# Patient Record
Sex: MALE | Race: WHITE | HISPANIC OR LATINO
[De-identification: names, ages, dates, MRNs, and addresses within clinical notes are randomized per-mention and may not be internally consistent; named-entity substitution may affect disease eponyms.]

---

## 2017-03-08 ENCOUNTER — LABORATORY RESULT (OUTPATIENT)
Age: 67
End: 2017-03-08

## 2017-03-22 ENCOUNTER — APPOINTMENT (OUTPATIENT)
Dept: RADIATION ONCOLOGY | Facility: CLINIC | Age: 67
End: 2017-03-22

## 2017-03-22 VITALS
OXYGEN SATURATION: 96 % | HEART RATE: 69 BPM | WEIGHT: 173.94 LBS | DIASTOLIC BLOOD PRESSURE: 79 MMHG | SYSTOLIC BLOOD PRESSURE: 148 MMHG | RESPIRATION RATE: 16 BRPM | BODY MASS INDEX: 31.81 KG/M2

## 2017-04-14 ENCOUNTER — EMERGENCY (EMERGENCY)
Facility: HOSPITAL | Age: 67
LOS: 1 days | Discharge: HOME | End: 2017-04-14
Attending: EMERGENCY MEDICINE | Admitting: EMERGENCY MEDICINE
Payer: MEDICARE

## 2017-04-14 VITALS
SYSTOLIC BLOOD PRESSURE: 149 MMHG | OXYGEN SATURATION: 98 % | TEMPERATURE: 99 F | RESPIRATION RATE: 18 BRPM | HEART RATE: 97 BPM | DIASTOLIC BLOOD PRESSURE: 97 MMHG

## 2017-04-14 VITALS
RESPIRATION RATE: 18 BRPM | SYSTOLIC BLOOD PRESSURE: 134 MMHG | TEMPERATURE: 99 F | OXYGEN SATURATION: 95 % | DIASTOLIC BLOOD PRESSURE: 96 MMHG | HEART RATE: 100 BPM

## 2017-04-14 DIAGNOSIS — J02.9 ACUTE PHARYNGITIS, UNSPECIFIED: ICD-10-CM

## 2017-04-14 DIAGNOSIS — R05 COUGH: ICD-10-CM

## 2017-04-14 PROCEDURE — 99283 EMERGENCY DEPT VISIT LOW MDM: CPT

## 2017-04-14 RX ORDER — IBUPROFEN 200 MG
600 TABLET ORAL ONCE
Qty: 0 | Refills: 0 | Status: COMPLETED | OUTPATIENT
Start: 2017-04-14 | End: 2017-04-14

## 2017-04-14 RX ADMIN — Medication 600 MILLIGRAM(S): at 15:00

## 2017-04-14 NOTE — ED ADULT NURSE NOTE - OBJECTIVE STATEMENT
66ym pt c/o sore throat starting yesterday; pt states getting difficult to swallow; pt is swallowing saliva; no drooling; aox3; no resp distress; lungs clear to auscultation; no chest pain; no n/v/d; no fever; + chills; skin warm dry intact; md at bedside

## 2017-04-14 NOTE — ED PROVIDER NOTE - NS ED MD SCRIBE ATTENDING SCRIBE SECTIONS
HIV/INTAKE ASSESSMENT/SCREENINGS/HISTORY OF PRESENT ILLNESS/VITAL SIGNS( Pullset)/REVIEW OF SYSTEMS/RESULTS/PHYSICAL EXAM/PAST MEDICAL/SURGICAL/SOCIAL HISTORY

## 2017-04-14 NOTE — ED PROVIDER NOTE - ENMT, MLM
Airway patent, Nasal mucosa clear. Mouth with normal mucosa. +mild pharyngeal erythema, no edema, no tonsilar exudates

## 2017-04-14 NOTE — ED PROVIDER NOTE - OBJECTIVE STATEMENT
66 year old male with PMHx of prostate CA, presents with complaint of throat pain worsening since last night. States he feels pain when eating and drinking but denies difficulty swallowing. Denies hoarseness. Reports subjective fever, nasal congestion and mild dry cough. No sick contacts. Denies recent N/V/D, abdominal pain.

## 2017-04-14 NOTE — ED PROVIDER NOTE - MEDICAL DECISION MAKING DETAILS
pt presents with symptoms of pharyngitis. No evidence of strep pharyngitis. Vitals stable, pt well appearing,  tolerating PO. No evidence of deep space infection. Will recommend NSAIDs and PMD followup.

## 2017-07-26 ENCOUNTER — APPOINTMENT (OUTPATIENT)
Dept: RADIATION ONCOLOGY | Facility: CLINIC | Age: 67
End: 2017-07-26

## 2017-07-26 VITALS
BODY MASS INDEX: 30.43 KG/M2 | DIASTOLIC BLOOD PRESSURE: 73 MMHG | WEIGHT: 165.34 LBS | RESPIRATION RATE: 16 BRPM | HEART RATE: 71 BPM | SYSTOLIC BLOOD PRESSURE: 104 MMHG | TEMPERATURE: 96.7 F | HEIGHT: 62 IN | OXYGEN SATURATION: 96 %

## 2018-01-26 ENCOUNTER — APPOINTMENT (OUTPATIENT)
Dept: RADIATION ONCOLOGY | Facility: CLINIC | Age: 68
End: 2018-01-26

## 2018-01-30 LAB
PSA FREE FLD-MCNC: 4.8
PSA FREE SERPL-MCNC: 0.01 NG/ML
PSA SERPL-MCNC: 0.21 NG/ML

## 2018-02-14 ENCOUNTER — APPOINTMENT (OUTPATIENT)
Age: 68
End: 2018-02-14
Payer: MEDICARE

## 2018-02-14 VITALS
DIASTOLIC BLOOD PRESSURE: 83 MMHG | SYSTOLIC BLOOD PRESSURE: 124 MMHG | HEART RATE: 77 BPM | WEIGHT: 169.19 LBS | BODY MASS INDEX: 30.94 KG/M2 | OXYGEN SATURATION: 96 %

## 2018-02-14 PROCEDURE — 99214 OFFICE O/P EST MOD 30 MIN: CPT

## 2018-02-14 RX ORDER — TAMSULOSIN HYDROCHLORIDE 0.4 MG/1
0.4 CAPSULE ORAL
Refills: 0 | Status: ACTIVE | COMMUNITY
Start: 2018-02-14

## 2018-08-17 ENCOUNTER — APPOINTMENT (OUTPATIENT)
Age: 68
End: 2018-08-17
Payer: MEDICARE

## 2018-08-17 PROBLEM — C61 MALIGNANT NEOPLASM OF PROSTATE: Chronic | Status: ACTIVE | Noted: 2017-04-14

## 2018-08-17 PROCEDURE — 99214 OFFICE O/P EST MOD 30 MIN: CPT

## 2018-08-17 RX ORDER — GUAIFENESIN SYRUP AND DEXTROMETHORPHAN 100; 10 MG/5ML; MG/5ML
100-10 SYRUP ORAL
Refills: 0 | Status: DISCONTINUED | COMMUNITY
Start: 2018-02-14 | End: 2018-08-17

## 2018-08-17 RX ORDER — METHYLPREDNISOLONE 4 MG/1
4 TABLET ORAL
Refills: 0 | Status: DISCONTINUED | COMMUNITY
Start: 2018-02-14 | End: 2018-08-17

## 2018-12-19 ENCOUNTER — APPOINTMENT (OUTPATIENT)
Dept: RADIATION ONCOLOGY | Facility: CLINIC | Age: 68
End: 2018-12-19
Payer: MEDICARE

## 2018-12-19 PROCEDURE — 99214 OFFICE O/P EST MOD 30 MIN: CPT

## 2018-12-19 RX ORDER — SILDENAFIL CITRATE 100 MG/1
TABLET, FILM COATED ORAL
Refills: 0 | Status: ACTIVE | COMMUNITY

## 2018-12-19 NOTE — REVIEW OF SYSTEMS
[Negative] : Allergic/Immunologic [Constipation: Grade 0] : Constipation: Grade 0 [Diarrhea: Grade 0] : Diarrhea: Grade 0 [Proctitis: Grade 0] : Proctitis: Grade 0 [Rectal Pain: Grade 0] : Rectal Pain: Grade 0 [Fatigue: Grade 0] : Fatigue: Grade 0 [Hematuria: Grade 0] : Hematuria: Grade 0 [Urinary Incontinence: Grade 0] : Urinary Incontinence: Grade 0  [Urinary Tract Pain: Grade 0] : Urinary Tract Pain: Grade 0 [Erectile Dysfunction: Grade 1 - Decrease in erectile function (frequency or rigidity of erections) but intervention not indicated (e.g., medication or use of mechanical device, penile pump)] : Erectile Dysfunction: Grade 1 - Decrease in erectile function (frequency or rigidity of erections) but intervention not indicated (e.g., medication or use of mechanical device, penile pump) [IPSS Score (0-40): ___] : IPSS score: [unfilled] [EPIC-CP Score (0-60): ___] : EPIC-CP score: [unfilled] [Urinary Frequency: Grade 1 - Present] : Urinary Frequency: Grade 1 - Present

## 2018-12-20 NOTE — PHYSICAL EXAM
[Extraocular Movements] : extraocular movements were intact [Hearing Threshold Finger Rub Not [place]] : hearing was normal [Normal] : oriented to person, place and time, the affect was normal, the mood was normal and not anxious [Blood on examination glove] : no blood on examination glove

## 2018-12-20 NOTE — REASON FOR VISIT
[Routine Follow-Up] : routine follow-up visit for [Prostate Cancer] : prostate cancer [Clinical -- TNM Staging] : TNM Stage: c [T: ___] : T[unfilled] [N: ___] : N[unfilled] [M: ___] : M[unfilled] [FreeTextEntry1] : pretreatment PSA 3.2 ng/mL

## 2018-12-20 NOTE — HISTORY OF PRESENT ILLNESS
[FreeTextEntry1] : Mr. Hipolito Rivas is a 67 y/o male with h/o of [id number] adenocarcinoma of the prostate with a pretreatment PSA of 3.2 ng/mL and [place] 3+4=7 disease, considered intermediate risk classification, AJCC stage IIA.  He received SBRT to a dose of 4250 cGy/ 5fx. He completed treatment on 1/11/16.  \par \par 12/19/18- FOLLOW Up\par Mr. Rivas returns for routine follow up. When he was last seen on 8/17/18, he was doing well and LUZ MARIA. He had gotten PSA checked by Dr. Castellon in the summertime (report not available at this time).  PSA last checked 10/31/18 and it was 0.1 (previous PSA was 0.21 on 1/30/18). He continues to take Flomax once daily.\par \par Today, he reports he continues to feel well, energy is good. Denies GI complaints to include blood in stool, diarrhea, constipation. Has nocturia twice a night, as well as occasional frequency and sensation of incomplete bladder emptying. Denies hematuria. He continues to have difficulty with erections. He tried a medication for ED once (cannot recall name), but it "made no difference" and thus he has not used it again.  IPSS 6, EPIC 13\par \par \par 8/17/18- Follow up\par Mr. Hipolito Rivas is a 67 y/o male with a h/o of [id number] adenocarcinoma of the prostate with a pretreatment PSA of 3.2 ng/mL and [place] 3+4=7 disease, considered intermediate risk classification, AJCC stage IIA.  He received SBRT to a dose of 4250 cGy/ 5fx. He completed treatment on 1/11/16.  At last visit on 2/14/18, he felt well, was advised to follow up in 6 months with new PSA. At the time. meds for ED were discussed, but not covered by insurance. \par \par PSA Trend\par 6/23/16: 1.48 ng/ml\par 11/14/16: 1.43 ng/mL\par 3/8/17 0.69 ng/mL\par 1/30/18: 0.21 ng/ml\par \par Today, he reports he is feeling well, energy is good. Denies having to push to urinate, denies nocturia. Occasionally has sensation of incomplete bladder emptying. He continues to have difficulty with erections. He reports he had PSA checked 2- 3 months ago with Dr. Castellon; results not available at this time. Continues to take Flomax once daily.\par IPSS 6, EPIC 9\par \par \par 2/14/18- Follow up\par Mr. Hipolito Quiroga is a 65 y/o male with a h/o of [id number] adenocarcinoma of the prostate with a pretreatment PSA of 3.2 ng/mL and [place] 3+4=7 disease, considered intermediate risk classification, AJCC stage IIA.  He received SBRT to a dose of 42.5Gy/5fx. He completed treatment on 1/11/16.  At last visit July 2017 he felt well, was recommended follow up in 6 months\par \par Today Mr. Rivas feels well. He has very occasional incomplete emptying, and very infrequently has to push. He has two times nightly nocturia. IPSS 8, EPIC 15. He continues to have difficulty with erections. He saw Dr. Castellon a few months ago, and has follow up scheduled with him next month.\par \par \par

## 2019-06-26 ENCOUNTER — APPOINTMENT (OUTPATIENT)
Dept: RADIATION ONCOLOGY | Facility: CLINIC | Age: 69
End: 2019-06-26

## 2019-07-05 ENCOUNTER — APPOINTMENT (OUTPATIENT)
Dept: RADIATION ONCOLOGY | Facility: CLINIC | Age: 69
End: 2019-07-05
Payer: MEDICARE

## 2019-07-19 NOTE — REVIEW OF SYSTEMS
[IPSS Score (0-40): ___] : IPSS score: [unfilled] [EPIC-CP Score (0-60): ___] : EPIC-CP score: [unfilled] [Negative] : Allergic/Immunologic [Constipation: Grade 0] : Constipation: Grade 0 [Diarrhea: Grade 0] : Diarrhea: Grade 0 [Proctitis: Grade 0] : Proctitis: Grade 0 [Rectal Pain: Grade 0] : Rectal Pain: Grade 0 [Fatigue: Grade 0] : Fatigue: Grade 0 [Hematuria: Grade 0] : Hematuria: Grade 0 [Urinary Incontinence: Grade 0] : Urinary Incontinence: Grade 0  [Urinary Tract Pain: Grade 0] : Urinary Tract Pain: Grade 0 [Urinary Frequency: Grade 1 - Present] : Urinary Frequency: Grade 1 - Present [Erectile Dysfunction: Grade 1 - Decrease in erectile function (frequency or rigidity of erections) but intervention not indicated (e.g., medication or use of mechanical device, penile pump)] : Erectile Dysfunction: Grade 1 - Decrease in erectile function (frequency or rigidity of erections) but intervention not indicated (e.g., medication or use of mechanical device, penile pump)

## 2019-07-24 ENCOUNTER — LABORATORY RESULT (OUTPATIENT)
Age: 69
End: 2019-07-24

## 2019-07-24 ENCOUNTER — APPOINTMENT (OUTPATIENT)
Dept: RADIATION ONCOLOGY | Facility: CLINIC | Age: 69
End: 2019-07-24
Payer: MEDICARE

## 2019-07-24 PROCEDURE — 99214 OFFICE O/P EST MOD 30 MIN: CPT

## 2019-07-24 NOTE — PHYSICAL EXAM
[Extraocular Movements] : extraocular movements were intact [Hearing Threshold Finger Rub Not [place]] : hearing was normal [Blood on examination glove] : no blood on examination glove [Normal] : the ears, nose and oropharynx were normal in appearance [No Focal Deficits] : no focal deficits

## 2019-07-24 NOTE — HISTORY OF PRESENT ILLNESS
[FreeTextEntry1] : Mr. Hipolito Rivas is a 68 y/o male with h/o of [id number] adenocarcinoma of the prostate with a pretreatment PSA of 3.2 ng/mL and [place] 3+4=7 disease, considered intermediate risk classification, AJCC stage IIA. He received SBRT to a dose of 4250 cGy/ 5fx. He completed treatment on 1/11/16. \par \par 7/24/19- FOLLOW UP\par Mr. Rivas returns for routine follow up. When he was last seen on 12/19/18, he was doing well, LUZ MARIA, and at pre-treatment baseline with exception of grade 1 ED. Plan was to RTC 6 months with PSA. His PSA was last checked 3/19 and it was 0.1 ng/ml. \par Today, he feels will, no urinary or bowel complaints, at pre-treatment baseline when taking flomax.  No fatigue, no intervalmedical events.  IPSS 4, EPIC 9.  Continues on Flomax 0.4 mg, symptoms worsen when he skips a dose\par \par \par 12/19/18- FOLLOW Up\par Mr. Rivas returns for routine follow up. When he was last seen on 8/17/18, he was doing well and LUZ MARIA. He had gotten PSA checked by Dr. Castellon in the summertime (report not available at this time).  PSA last checked 10/31/18 and it was 0.1 (previous PSA was 0.21 on 1/30/18). He continues to take Flomax once daily.\par \par Today, he reports he continues to feel well, energy is good. Denies GI complaints to include blood in stool, diarrhea, constipation. Has nocturia twice a night, as well as occasional frequency and sensation of incomplete bladder emptying. Denies hematuria. He continues to have difficulty with erections. He tried a medication for ED once (cannot recall name), but it "made no difference" and thus he has not used it again.  IPSS 6, EPIC 13\par \par \par 8/17/18- Follow up\par Mr. Hipolito Rivas is a 69 y/o male with a h/o of [id number] adenocarcinoma of the prostate with a pretreatment PSA of 3.2 ng/mL and [place] 3+4=7 disease, considered intermediate risk classification, AJCC stage IIA.  He received SBRT to a dose of 4250 cGy/ 5fx. He completed treatment on 1/11/16.  At last visit on 2/14/18, he felt well, was advised to follow up in 6 months with new PSA. At the time. meds for ED were discussed, but not covered by insurance. \par \par PSA Trend\par 6/23/16: 1.48 ng/ml\par 11/14/16: 1.43 ng/mL\par 3/8/17 0.69 ng/mL\par 1/30/18: 0.21 ng/ml\par \par Today, he reports he is feeling well, energy is good. Denies having to push to urinate, denies nocturia. Occasionally has sensation of incomplete bladder emptying. He continues to have difficulty with erections. He reports he had PSA checked 2- 3 months ago with Dr. Castellon; results not available at this time. Continues to take Flomax once daily.\par IPSS 6, EPIC 9\par \par \par 2/14/18- Follow up\par Mr. Hipolito Quiroga is a 65 y/o male with a h/o of [id number] adenocarcinoma of the prostate with a pretreatment PSA of 3.2 ng/mL and [place] 3+4=7 disease, considered intermediate risk classification, AJCC stage IIA.  He received SBRT to a dose of 42.5Gy/5fx. He completed treatment on 1/11/16.  At last visit July 2017 he felt well, was recommended follow up in 6 months\par \par Today Mr. Rivas feels well. He has very occasional incomplete emptying, and very infrequently has to push. He has two times nightly nocturia. IPSS 8, EPIC 15. He continues to have difficulty with erections. He saw Dr. Castellon a few months ago, and has follow up scheduled with him next month.\par \par \par

## 2019-08-01 NOTE — ED ADULT TRIAGE NOTE - CCCP TRG CHIEF CMPLNT
Pt. Brought to IR dept on stretcher and on room air. Pt. Is alert and oriented x3. Pt. Is also on heart, blood pressure, and oxygen sat monitoring. Pt. Has his daughter and mother with him at bedside.  Pt. Educated on port placement along with medications used for procedure and with conscious sedation.    cough

## 2019-11-22 LAB — PSA SERPL-MCNC: 0.04 NG/ML

## 2019-11-26 ENCOUNTER — APPOINTMENT (OUTPATIENT)
Dept: RADIATION ONCOLOGY | Facility: CLINIC | Age: 69
End: 2019-11-26
Payer: MEDICARE

## 2019-11-26 PROCEDURE — 99214 OFFICE O/P EST MOD 30 MIN: CPT

## 2019-11-26 RX ORDER — METOPROLOL SUCCINATE 50 MG/1
50 TABLET, EXTENDED RELEASE ORAL
Qty: 90 | Refills: 0 | Status: ACTIVE | COMMUNITY
Start: 2019-07-31

## 2019-11-26 RX ORDER — CHLORTHALIDONE 25 MG/1
25 TABLET ORAL
Qty: 90 | Refills: 0 | Status: ACTIVE | COMMUNITY
Start: 2019-05-31

## 2019-11-26 RX ORDER — FLUTICASONE PROPIONATE 50 UG/1
50 SPRAY, METERED NASAL
Qty: 16 | Refills: 0 | Status: ACTIVE | COMMUNITY
Start: 2019-09-12

## 2019-11-26 RX ORDER — TERBINAFINE HYDROCHLORIDE 250 MG/1
250 TABLET ORAL
Qty: 30 | Refills: 0 | Status: ACTIVE | COMMUNITY
Start: 2019-07-17

## 2019-11-26 RX ORDER — ATORVASTATIN CALCIUM 40 MG/1
40 TABLET, FILM COATED ORAL
Qty: 90 | Refills: 0 | Status: ACTIVE | COMMUNITY
Start: 2019-06-26

## 2019-11-26 RX ORDER — AMOXICILLIN 875 MG/1
875 TABLET, FILM COATED ORAL
Qty: 20 | Refills: 0 | Status: ACTIVE | COMMUNITY
Start: 2019-11-13

## 2019-11-26 RX ORDER — CYCLOSPORINE 0.5 MG/ML
0.05 EMULSION OPHTHALMIC
Qty: 60 | Refills: 0 | Status: ACTIVE | COMMUNITY
Start: 2019-08-06

## 2019-11-26 NOTE — REVIEW OF SYSTEMS
[IPSS Score (0-40): ___] : IPSS score: [unfilled] [Negative] : Allergic/Immunologic [Constipation: Grade 0] : Constipation: Grade 0 [Diarrhea: Grade 0] : Diarrhea: Grade 0 [Proctitis: Grade 0] : Proctitis: Grade 0 [Rectal Pain: Grade 0] : Rectal Pain: Grade 0 [Fatigue: Grade 0] : Fatigue: Grade 0 [Hematuria: Grade 0] : Hematuria: Grade 0 [Urinary Incontinence: Grade 0] : Urinary Incontinence: Grade 0  [Urinary Tract Pain: Grade 0] : Urinary Tract Pain: Grade 0 [Urinary Frequency: Grade 1 - Present] : Urinary Frequency: Grade 1 - Present [Erectile Dysfunction: Grade 1 - Decrease in erectile function (frequency or rigidity of erections) but intervention not indicated (e.g., medication or use of mechanical device, penile pump)] : Erectile Dysfunction: Grade 1 - Decrease in erectile function (frequency or rigidity of erections) but intervention not indicated (e.g., medication or use of mechanical device, penile pump) [Nocturia] : nocturia [EPIC-CP Score (0-60): ___] : EPIC-CP score: [unfilled]

## 2019-11-27 NOTE — HISTORY OF PRESENT ILLNESS
ERP at bedside for re-eval.    
ERP at bedside.    
Medicated per MAR.  Updated on POC.    
PIV removed.  Patient and significant other given discharge instructions and follow up information, instructed patient not to drive, verbalized understanding, ambulatory out of ED w/steady gait.    
Patient returned from CT.    
[FreeTextEntry1] : Mr. Hipolito Rivas is a 69 year old male with history of a [id number], [place] 3+4= 7, adenocarcinoma of the prostate with a pretreatment PSA of 3.2 ng/mL, considered intermediate risk classification, AJCC stage IIA. He received SBRT to a dose of 4250 cGy over 5 fractions, completed 1/11/16. \par \par 11/26/19- FOLLOW UP\par Mr. Rivas returns for routine follow up. When he was last seen on 7/24/19, he was doing well, LUZ MARIA, and at pre-treatment baseline with exception of grade 1 ED. Plan was to RTC 6 months with PSA. His PSA was checked 7/24/19 and it was 0.05 ng/mL. It was last checked 11/22/19 and it was 0.04 g/ mL.  Continues on Tamsulosin 0.4 mg daily. \par \par Today, he reports he is stable in terms of  function. IPSS- 6, EPIC- 10. He notes nocturia x 2, occasional urinary frequency. He denies bowel complaints, but does report that if he does not take Metamucil, stools are hard and sometimes he has small amount of bleeding. Reports satisfactory sexual function with Viagra. Reports good energy, no interval medical events.   \par \par \par 7/24/19- FOLLOW UP\par Mr. Rivas returns for routine follow up. When he was last seen on 12/19/18, he was doing well, LUZ MARIA, and at pre-treatment baseline with exception of grade 1 ED. Plan was to RTC 6 months with PSA. His PSA was last checked 3/19 and it was 0.1 ng/ml. \par Today, he feels well, no urinary or bowel complaints, at pre-treatment baseline when taking flomax.  No fatigue, no interval medical events.  IPSS 4, EPIC 9.  Continues on Flomax 0.4 mg, symptoms worsen when he skips a dose\par \par \par 12/19/18- FOLLOW Up\par Mr. Rivas returns for routine follow up. When he was last seen on 8/17/18, he was doing well and LUZ MARIA. He had gotten PSA checked by Dr. Castellon in the summertime (report not available at this time).  PSA last checked 10/31/18 and it was 0.1 (previous PSA was 0.21 on 1/30/18). He continues to take Flomax once daily.\par \par Today, he reports he continues to feel well, energy is good. Denies GI complaints to include blood in stool, diarrhea, constipation. Has nocturia twice a night, as well as occasional frequency and sensation of incomplete bladder emptying. Denies hematuria. He continues to have difficulty with erections. He tried a medication for ED once (cannot recall name), but it "made no difference" and thus he has not used it again.  IPSS 6, EPIC 13\par \par \par 8/17/18- Follow up\par Mr. Hipolito Rivas is a 67 y/o male with a h/o of [id number] adenocarcinoma of the prostate with a pretreatment PSA of 3.2 ng/mL and [place] 3+4=7 disease, considered intermediate risk classification, AJCC stage IIA.  He received SBRT to a dose of 4250 cGy/ 5fx. He completed treatment on 1/11/16.  At last visit on 2/14/18, he felt well, was advised to follow up in 6 months with new PSA. At the time. meds for ED were discussed, but not covered by insurance. \par \par PSA Trend\par 6/23/16: 1.48 ng/ml\par 11/14/16: 1.43 ng/mL\par 3/8/17 0.69 ng/mL\par 1/30/18: 0.21 ng/ml\par \par Today, he reports he is feeling well, energy is good. Denies having to push to urinate, denies nocturia. Occasionally has sensation of incomplete bladder emptying. He continues to have difficulty with erections. He reports he had PSA checked 2- 3 months ago with Dr. Castellon; results not available at this time. Continues to take Flomax once daily.\par IPSS 6, EPIC 9\par \par \par 2/14/18- Follow up\par Mr. Hipolito Quiroga is a 67 y/o male with a h/o of [id number] adenocarcinoma of the prostate with a pretreatment PSA of 3.2 ng/mL and [place] 3+4=7 disease, considered intermediate risk classification, AJCC stage IIA.  He received SBRT to a dose of 42.5Gy/5fx. He completed treatment on 1/11/16.  At last visit July 2017 he felt well, was recommended follow up in 6 months\par \par Today Mr. Rivas feels well. He has very occasional incomplete emptying, and very infrequently has to push. He has two times nightly nocturia. IPSS 8, EPIC 15. He continues to have difficulty with erections. He saw Dr. Castellon a few months ago, and has follow up scheduled with him next month.\par \par \par

## 2019-11-27 NOTE — PHYSICAL EXAM
[Extraocular Movements] : extraocular movements were intact [No Focal Deficits] : no focal deficits [Normal] : oriented to person, place and time, the affect was normal, the mood was normal and not anxious [Sclera] : the sclera and conjunctiva were normal [] : no respiratory distress [Respiration, Rhythm And Depth] : normal respiratory rhythm and effort [Exaggerated Use Of Accessory Muscles For Inspiration] : no accessory muscle use [Auscultation Breath Sounds / Voice Sounds] : lungs were clear to auscultation bilaterally [Blood on examination glove] : no blood on examination glove

## 2020-05-27 ENCOUNTER — APPOINTMENT (OUTPATIENT)
Dept: RADIATION ONCOLOGY | Facility: CLINIC | Age: 70
End: 2020-05-27
Payer: MEDICARE

## 2020-05-28 LAB — PSA SERPL-MCNC: 0.06 NG/ML

## 2020-06-03 ENCOUNTER — APPOINTMENT (OUTPATIENT)
Dept: RADIATION ONCOLOGY | Facility: CLINIC | Age: 70
End: 2020-06-03
Payer: MEDICARE

## 2020-06-03 DIAGNOSIS — C61 MALIGNANT NEOPLASM OF PROSTATE: ICD-10-CM

## 2020-06-03 PROCEDURE — 99212 OFFICE O/P EST SF 10 MIN: CPT

## 2020-06-03 NOTE — HISTORY OF PRESENT ILLNESS
[FreeTextEntry1] : Mr. Hipolito Rivas is a 69 year old male with history of a [id number], [place] 3+4= 7, adenocarcinoma of the prostate with a pretreatment PSA of 3.2 ng/mL, considered intermediate risk classification, AJCC stage IIA. He received SBRT to a dose of 4250 cGy over 5 fractions, completed 1/11/16. \par \par 6/3/2020- FOLLOW UP\par Mr. Rivas returns for routine follow up. When he was last seen on 11/26/19, he was doing well, LUZ MARIA, and at pre-treatment baseline with exception of grade 1 ED. Plan was to RTC 6 months with PSA. Continues on Tamsulosin 0.4 mg daily. PSA last checked on 5/28/2020 was 0.06 ng/ mL. \par \par Today, he states he is doing well.  He continues on tamsulosin.  He admits to nocturia x 1.  He denies daytime frequency or incontinence.  He denies any bowel issues. \par \par \par 11/26/19- FOLLOW UP\par Mr. Rivas returns for routine follow up. When he was last seen on 7/24/19, he was doing well, LUZ MARIA, and at pre-treatment baseline with exception of grade 1 ED. Plan was to RTC 6 months with PSA. His PSA was checked 7/24/19 and it was 0.05 ng/mL. It was last checked 11/22/19 and it was 0.04 g/ mL.  Continues on Tamsulosin 0.4 mg daily. \par \par Today, he reports he is stable in terms of  function. IPSS- 6, EPIC- 10. He notes nocturia x 2, occasional urinary frequency. He denies bowel complaints, but does report that if he does not take Metamucil, stools are hard and sometimes he has small amount of bleeding. Reports satisfactory sexual function with Viagra. Reports good energy, no interval medical events.   \par \par \par 7/24/19- FOLLOW UP\par Mr. Rivas returns for routine follow up. When he was last seen on 12/19/18, he was doing well, LUZ MARIA, and at pre-treatment baseline with exception of grade 1 ED. Plan was to RTC 6 months with PSA. His PSA was last checked 3/19 and it was 0.1 ng/ml. \par Today, he feels well, no urinary or bowel complaints, at pre-treatment baseline when taking flomax.  No fatigue, no interval medical events.  IPSS 4, EPIC 9.  Continues on Flomax 0.4 mg, symptoms worsen when he skips a dose\par \par \par 12/19/18- FOLLOW Up\par Mr. Rivas returns for routine follow up. When he was last seen on 8/17/18, he was doing well and LUZ MARIA. He had gotten PSA checked by Dr. Castellon in the summertime (report not available at this time).  PSA last checked 10/31/18 and it was 0.1 (previous PSA was 0.21 on 1/30/18). He continues to take Flomax once daily.\par \par Today, he reports he continues to feel well, energy is good. Denies GI complaints to include blood in stool, diarrhea, constipation. Has nocturia twice a night, as well as occasional frequency and sensation of incomplete bladder emptying. Denies hematuria. He continues to have difficulty with erections. He tried a medication for ED once (cannot recall name), but it "made no difference" and thus he has not used it again.  IPSS 6, EPIC 13\par \par \par 8/17/18- Follow up\par Mr. Hipolito Rivas is a 67 y/o male with a h/o of [id number] adenocarcinoma of the prostate with a pretreatment PSA of 3.2 ng/mL and [place] 3+4=7 disease, considered intermediate risk classification, AJCC stage IIA.  He received SBRT to a dose of 4250 cGy/ 5fx. He completed treatment on 1/11/16.  At last visit on 2/14/18, he felt well, was advised to follow up in 6 months with new PSA. At the time. meds for ED were discussed, but not covered by insurance. \par \par PSA Trend\par 6/23/16: 1.48 ng/ml\par 11/14/16: 1.43 ng/mL\par 3/8/17 0.69 ng/mL\par 1/30/18: 0.21 ng/ml\par \par Today, he reports he is feeling well, energy is good. Denies having to push to urinate, denies nocturia. Occasionally has sensation of incomplete bladder emptying. He continues to have difficulty with erections. He reports he had PSA checked 2- 3 months ago with Dr. Castellon; results not available at this time. Continues to take Flomax once daily.\par IPSS 6, EPIC 9\par \par \par 2/14/18- Follow up\par Mr. Hipolito Quiroga is a 65 y/o male with a h/o of [id number] adenocarcinoma of the prostate with a pretreatment PSA of 3.2 ng/mL and [place] 3+4=7 disease, considered intermediate risk classification, AJCC stage IIA.  He received SBRT to a dose of 42.5Gy/5fx. He completed treatment on 1/11/16.  At last visit July 2017 he felt well, was recommended follow up in 6 months\par \par Today Mr. Rivas feels well. He has very occasional incomplete emptying, and very infrequently has to push. He has two times nightly nocturia. IPSS 8, EPIC 15. He continues to have difficulty with erections. He saw Dr. Castellon a few months ago, and has follow up scheduled with him next month.\par \par \par

## 2020-06-03 NOTE — PHYSICAL EXAM
[Blood on examination glove] : no blood on examination glove [FreeTextEntry1] : This was an audio only encounter.  A physical exam was not possible.

## 2020-12-04 LAB — PSA SERPL-MCNC: 0.03 NG/ML

## 2021-01-13 ENCOUNTER — APPOINTMENT (OUTPATIENT)
Dept: RADIATION ONCOLOGY | Facility: CLINIC | Age: 71
End: 2021-01-13
Payer: MEDICARE

## 2021-01-13 PROCEDURE — 99442: CPT

## 2021-01-13 NOTE — REVIEW OF SYSTEMS
[Nocturia] : nocturia [IPSS Score (0-40): ___] : IPSS score: [unfilled] [EPIC-CP Score (0-60): ___] : EPIC-CP score: [unfilled] [Negative] : Heme/Lymph [Constipation: Grade 0] : Constipation: Grade 0 [Diarrhea: Grade 0] : Diarrhea: Grade 0 [Proctitis: Grade 0] : Proctitis: Grade 0 [Rectal Pain: Grade 0] : Rectal Pain: Grade 0 [Fatigue: Grade 0] : Fatigue: Grade 0 [Hematuria: Grade 0] : Hematuria: Grade 0 [Urinary Incontinence: Grade 0] : Urinary Incontinence: Grade 0  [Urinary Tract Pain: Grade 0] : Urinary Tract Pain: Grade 0 [Urinary Frequency: Grade 1 - Present] : Urinary Frequency: Grade 1 - Present [Erectile Dysfunction: Grade 1 - Decrease in erectile function (frequency or rigidity of erections) but intervention not indicated (e.g., medication or use of mechanical device, penile pump)] : Erectile Dysfunction: Grade 1 - Decrease in erectile function (frequency or rigidity of erections) but intervention not indicated (e.g., medication or use of mechanical device, penile pump)

## 2021-01-14 RX ORDER — SILDENAFIL 100 MG/1
100 TABLET, FILM COATED ORAL
Qty: 20 | Refills: 3 | Status: ACTIVE | COMMUNITY
Start: 2021-01-14 | End: 1900-01-01

## 2021-01-14 NOTE — HISTORY OF PRESENT ILLNESS
[Home] : at home, [unfilled] , at the time of the visit. [Medical Office: ([facility])___] : at the medical office located in  [Verbal consent obtained from patient] : the patient, [unfilled] [FreeTextEntry1] : Mr. Hipolito Rivas is a 70year old male with history of a [id number], Leopold[place] 3+4= 7, adenocarcinoma of the prostate with a pretreatment PSA of 3.2 ng/mL, considered intermediate risk classification, AJCC stage IIA. He received SBRT to a dose of 4250 cGy over 5 fractions, completed 1/11/16. \par \par 1/13/21- FOLLOW UP\par Mr. Rivas calls us today for telephonic follow up. When he was last seen on 6/3/2020, he was doing well and LUZ MARIA. Plan was to RTC 6 months with PSA.  He notes that he is feeling quite well, with no appreciable symptomatology related to his definitive radiation therapy.  Specifically, he notes good urine function with nocturia x2, while still using flomax 0.4mg at night.  He denies dysuria or incontinence.  He denies blood in the urine.  He has no blood or mucous in the stool, and denies rectal pain.  He is able to have erections and wishes to try Viagra.His PSA was 0.03ng/ml on 11/30/2020 was 0.03 ng/ mL. \par \par \par ---------------------------\par 6/3/2020- FOLLOW UP\par Mr. Rivas returns for routine follow up. When he was last seen on 11/26/19, he was doing well, LUZ MARIA, and at pre-treatment baseline with exception of grade 1 ED. Plan was to RTC 6 months with PSA. Continues on Tamsulosin 0.4 mg daily. PSA last checked on 5/28/2020 was 0.06 ng/ mL. \par \par Today, he states he is doing well.  He continues on tamsulosin.  He admits to nocturia x 1.  He denies daytime frequency or incontinence.  He denies any bowel issues. \par \par __________________\par 11/26/19- FOLLOW UP\par Mr. Rivas returns for routine follow up. When he was last seen on 7/24/19, he was doing well, LUZ MARIA, and at pre-treatment baseline with exception of grade 1 ED. Plan was to RTC 6 months with PSA. His PSA was checked 7/24/19 and it was 0.05 ng/mL. It was last checked 11/22/19 and it was 0.04 g/ mL.  Continues on Tamsulosin 0.4 mg daily. \par \par Today, he reports he is stable in terms of  function. IPSS- 6, EPIC- 10. He notes nocturia x 2, occasional urinary frequency. He denies bowel complaints, but does report that if he does not take Metamucil, stools are hard and sometimes he has small amount of bleeding. Reports satisfactory sexual function with Viagra. Reports good energy, no interval medical events.   \par \par _________________\par 7/24/19- FOLLOW UP\par Mr. Rivas returns for routine follow up. When he was last seen on 12/19/18, he was doing well, LUZ MARIA, and at pre-treatment baseline with exception of grade 1 ED. Plan was to RTC 6 months with PSA. His PSA was last checked 3/19 and it was 0.1 ng/ml. \par Today, he feels well, no urinary or bowel complaints, at pre-treatment baseline when taking flomax.  No fatigue, no interval medical events.  IPSS 4, EPIC 9.  Continues on Flomax 0.4 mg, symptoms worsen when he skips a dose\par \par __________________\par 12/19/18- FOLLOW UP\vee Rivas returns for routine follow up. When he was last seen on 8/17/18, he was doing well and LUZ MARIA. He had gotten PSA checked by Dr. Castellon in the summertime (report not available at this time).  PSA last checked 10/31/18 and it was 0.1 (previous PSA was 0.21 on 1/30/18). He continues to take Flomax once daily.\par \par Today, he reports he continues to feel well, energy is good. Denies GI complaints to include blood in stool, diarrhea, constipation. Has nocturia twice a night, as well as occasional frequency and sensation of incomplete bladder emptying. Denies hematuria. He continues to have difficulty with erections. He tried a medication for ED once (cannot recall name), but it "made no difference" and thus he has not used it again.  IPSS 6, EPIC 13\par \par _______________\par 8/17/18- Follow up\par Mr. Hipolito Rivas is a 69 y/o male with a h/o of [id number] adenocarcinoma of the prostate with a pretreatment PSA of 3.2 ng/mL and [place] 3+4=7 disease, considered intermediate risk classification, AJCC stage IIA.  He received SBRT to a dose of 4250 cGy/ 5fx. He completed treatment on 1/11/16.  At last visit on 2/14/18, he felt well, was advised to follow up in 6 months with new PSA. At the time. meds for ED were discussed, but not covered by insurance. \par \par PSA Trend\par 6/23/16: 1.48 ng/ml\par 11/14/16: 1.43 ng/mL\par 3/8/17 0.69 ng/mL\par 1/30/18: 0.21 ng/ml\par \par Today, he reports he is feeling well, energy is good. Denies having to push to urinate, denies nocturia. Occasionally has sensation of incomplete bladder emptying. He continues to have difficulty with erections. He reports he had PSA checked 2- 3 months ago with Dr. Castellon; results not available at this time. Continues to take Flomax once daily.\par IPSS 6, EPIC 9\par \par \par 2/14/18- Follow up\par Mr. Hipolito Quiroga is a 67 y/o male with a h/o of [id number] adenocarcinoma of the prostate with a pretreatment PSA of 3.2 ng/mL and [place] 3+4=7 disease, considered intermediate risk classification, AJCC stage IIA.  He received SBRT to a dose of 42.5Gy/5fx. He completed treatment on 1/11/16.  At last visit July 2017 he felt well, was recommended follow up in 6 months\par \par Today Mr. Rivas feels well. He has very occasional incomplete emptying, and very infrequently has to push. He has two times nightly nocturia. IPSS 8, EPIC 15. He continues to have difficulty with erections. He saw Dr. Castellon a few months ago, and has follow up scheduled with him next month.\par

## 2024-03-22 NOTE — ED ADULT NURSE NOTE - FALLEN IN THE PAST
Left msg outside lab order received from Stefania Mcfadden, to call to schedule a fasting lab appointment, orders have been entered by so TOM on 3- ok to schedule appt when patient calls back in.   
no